# Patient Record
Sex: MALE | Race: WHITE | NOT HISPANIC OR LATINO | Employment: FULL TIME | URBAN - METROPOLITAN AREA
[De-identification: names, ages, dates, MRNs, and addresses within clinical notes are randomized per-mention and may not be internally consistent; named-entity substitution may affect disease eponyms.]

---

## 2018-07-10 DIAGNOSIS — I10 ESSENTIAL HYPERTENSION: Primary | ICD-10-CM

## 2018-07-10 RX ORDER — FUROSEMIDE 40 MG/1
TABLET ORAL
Qty: 30 TABLET | Refills: 5 | Status: SHIPPED | OUTPATIENT
Start: 2018-07-10 | End: 2018-07-24 | Stop reason: SDUPTHER

## 2018-07-18 DIAGNOSIS — B00.9 HERPES: Primary | ICD-10-CM

## 2018-07-18 RX ORDER — ACYCLOVIR 400 MG/1
TABLET ORAL
Qty: 20 TABLET | Refills: 1 | OUTPATIENT
Start: 2018-07-18

## 2018-07-18 NOTE — TELEPHONE ENCOUNTER
Please find out why he needs acyclovir that was requested by pharmacy  Is it for cold sore or herpes flare up?   thanks

## 2018-07-24 DIAGNOSIS — I10 ESSENTIAL HYPERTENSION: ICD-10-CM

## 2018-07-24 RX ORDER — FUROSEMIDE 40 MG/1
TABLET ORAL
Qty: 90 TABLET | Refills: 1 | Status: SHIPPED | OUTPATIENT
Start: 2018-07-24 | End: 2018-10-24 | Stop reason: SDUPTHER

## 2018-09-07 DIAGNOSIS — B00.9 HSV INFECTION: Primary | ICD-10-CM

## 2018-09-07 RX ORDER — WARFARIN SODIUM 2.5 MG/1
TABLET ORAL
COMMUNITY
Start: 2017-07-03

## 2018-09-07 RX ORDER — SILDENAFIL 100 MG/1
TABLET, FILM COATED ORAL
COMMUNITY
Start: 2017-10-17

## 2018-09-07 RX ORDER — ASPIRIN 81 MG/1
TABLET, CHEWABLE ORAL EVERY 24 HOURS
COMMUNITY
Start: 2017-07-03

## 2018-09-07 RX ORDER — ALPRAZOLAM 2 MG/1
TABLET ORAL
COMMUNITY
Start: 2013-12-02

## 2018-09-07 RX ORDER — MOMETASONE FUROATE 50 UG/1
SPRAY, METERED NASAL
COMMUNITY
Start: 2017-02-15

## 2018-09-07 RX ORDER — ACYCLOVIR 400 MG/1
400 TABLET ORAL 2 TIMES DAILY
Qty: 20 TABLET | Refills: 2 | Status: SHIPPED | OUTPATIENT
Start: 2018-09-07 | End: 2018-10-16 | Stop reason: SDUPTHER

## 2018-09-07 RX ORDER — ATORVASTATIN CALCIUM 20 MG/1
TABLET, FILM COATED ORAL
COMMUNITY
Start: 2018-06-05

## 2018-09-07 RX ORDER — ELECTROLYTES/DEXTROSE
SOLUTION, ORAL ORAL EVERY 24 HOURS
COMMUNITY
Start: 2015-11-04

## 2018-09-07 RX ORDER — METOPROLOL TARTRATE 50 MG/1
50 TABLET, FILM COATED ORAL 2 TIMES DAILY
Refills: 5 | COMMUNITY
Start: 2018-06-12

## 2018-09-07 RX ORDER — DOXYCYCLINE HYCLATE 100 MG/1
CAPSULE ORAL EVERY 24 HOURS
COMMUNITY
Start: 2017-10-06 | End: 2018-10-16 | Stop reason: SDUPTHER

## 2018-09-25 ENCOUNTER — OFFICE VISIT (OUTPATIENT)
Dept: FAMILY MEDICINE CLINIC | Facility: CLINIC | Age: 68
End: 2018-09-25
Payer: COMMERCIAL

## 2018-09-25 VITALS
WEIGHT: 168.2 LBS | SYSTOLIC BLOOD PRESSURE: 128 MMHG | OXYGEN SATURATION: 98 % | DIASTOLIC BLOOD PRESSURE: 72 MMHG | RESPIRATION RATE: 16 BRPM | TEMPERATURE: 98.2 F | HEART RATE: 84 BPM

## 2018-09-25 DIAGNOSIS — G47.33 OBSTRUCTIVE SLEEP APNEA ON CPAP: ICD-10-CM

## 2018-09-25 DIAGNOSIS — R21 RASH: ICD-10-CM

## 2018-09-25 DIAGNOSIS — I11.9 HYPERTENSIVE HEART DISEASE WITHOUT HEART FAILURE: ICD-10-CM

## 2018-09-25 DIAGNOSIS — Z95.0 CARDIAC PACEMAKER IN SITU: Chronic | ICD-10-CM

## 2018-09-25 DIAGNOSIS — B00.9 HERPES SIMPLEX: ICD-10-CM

## 2018-09-25 DIAGNOSIS — Z99.89 OBSTRUCTIVE SLEEP APNEA ON CPAP: ICD-10-CM

## 2018-09-25 DIAGNOSIS — I10 BENIGN HYPERTENSION: Primary | ICD-10-CM

## 2018-09-25 PROCEDURE — 99214 OFFICE O/P EST MOD 30 MIN: CPT | Performed by: FAMILY MEDICINE

## 2018-09-25 PROCEDURE — 3074F SYST BP LT 130 MM HG: CPT | Performed by: FAMILY MEDICINE

## 2018-09-25 PROCEDURE — 1160F RVW MEDS BY RX/DR IN RCRD: CPT | Performed by: FAMILY MEDICINE

## 2018-09-25 PROCEDURE — 3078F DIAST BP <80 MM HG: CPT | Performed by: FAMILY MEDICINE

## 2018-09-25 RX ORDER — CLOTRIMAZOLE AND BETAMETHASONE DIPROPIONATE 10; .64 MG/G; MG/G
CREAM TOPICAL 2 TIMES DAILY
Qty: 15 G | Refills: 3 | Status: SHIPPED | OUTPATIENT
Start: 2018-09-25

## 2018-09-25 NOTE — PATIENT INSTRUCTIONS
Dr Dwain Leigh, DO     Urologist in [place], [place]   Address: [address] [facility], [address] [place] [address]     Phone: [phone]    Or    Dr Maryjane Cheney 11, [address], [address] [facility], [address] [place] [address]  Kesha Kurtz 76, D O    Male   [phone]

## 2018-09-27 PROBLEM — I48.0 PAROXYSMAL ATRIAL FIBRILLATION (HCC): Chronic | Status: ACTIVE | Noted: 2017-10-17

## 2018-09-27 PROBLEM — Z95.2 S/P AVR (AORTIC VALVE REPLACEMENT): Status: ACTIVE | Noted: 2017-04-24

## 2018-09-27 PROBLEM — I71.20 THORACIC AORTIC ANEURYSM WITHOUT RUPTURE (HCC): Status: ACTIVE | Noted: 2017-07-03

## 2018-09-27 PROBLEM — Z95.2 S/P AVR (AORTIC VALVE REPLACEMENT): Chronic | Status: ACTIVE | Noted: 2017-04-24

## 2018-09-27 PROBLEM — I71.2 THORACIC AORTIC ANEURYSM WITHOUT RUPTURE (HCC): Status: ACTIVE | Noted: 2017-07-03

## 2018-09-27 PROBLEM — Z98.890 S/P TVR (TRICUSPID VALVE REPAIR): Status: ACTIVE | Noted: 2017-04-24

## 2018-09-27 PROBLEM — I48.0 PAROXYSMAL ATRIAL FIBRILLATION (HCC): Status: ACTIVE | Noted: 2017-10-17

## 2018-09-27 PROBLEM — Z95.0 CARDIAC PACEMAKER IN SITU: Status: ACTIVE | Noted: 2018-06-12

## 2018-09-27 PROBLEM — I44.1 AV BLOCK, 2ND DEGREE: Status: ACTIVE | Noted: 2018-06-12

## 2018-09-27 PROBLEM — I10 BENIGN HYPERTENSION: Chronic | Status: ACTIVE | Noted: 2017-02-15

## 2018-09-27 PROBLEM — Z98.890 S/P TVR (TRICUSPID VALVE REPAIR): Chronic | Status: ACTIVE | Noted: 2017-04-24

## 2018-09-27 PROBLEM — Z95.0 CARDIAC PACEMAKER IN SITU: Chronic | Status: ACTIVE | Noted: 2018-06-12

## 2018-09-27 PROBLEM — Z86.79 S/P ASCENDING AORTIC ANEURYSM REPAIR: Status: ACTIVE | Noted: 2017-04-24

## 2018-09-27 PROBLEM — Z98.890 S/P ASCENDING AORTIC ANEURYSM REPAIR: Chronic | Status: ACTIVE | Noted: 2017-04-24

## 2018-09-27 PROBLEM — Z98.890 S/P ASCENDING AORTIC ANEURYSM REPAIR: Status: ACTIVE | Noted: 2017-04-24

## 2018-09-27 PROBLEM — Z86.79 S/P ASCENDING AORTIC ANEURYSM REPAIR: Chronic | Status: ACTIVE | Noted: 2017-04-24

## 2018-09-27 PROBLEM — I10 BENIGN HYPERTENSION: Status: ACTIVE | Noted: 2017-02-15

## 2018-09-28 NOTE — PROGRESS NOTES
Assessment/Plan:           Problem List Items Addressed This Visit     Hypertensive heart disease (Chronic)     The current medical regimen is effective;  continue present plan and medications  F/u with cardiology         Rash    Relevant Medications    clotrimazole-betamethasone (LOTRISONE) 1-0 05 % cream    Benign hypertension - Primary (Chronic)     The current medical regimen is effective;  continue present plan and medications  Cardiac pacemaker in situ (Chronic)     Continue regular follow up with Dr Douglas Alvarez and pacemaker clinic         Herpes simplex (Chronic)    Obstructive sleep apnea on CPAP (Chronic)     The current medical regimen is effective;  continue present plan and medications  Subjective:      Patient ID: Sunita Haro is a 69 y o  male  75-year-old male with past medical history of hypertension, asthma, chronic HSV, rosacea, status post cardiac surgery and pacemaker placement for heart block paroxysmal atrial fibrillation, persistent insomnia presents today for chronic conditions follow-up  Overall he is feeling well and denies any specific concerns today  He is taking all of his medications and also using his CPAP  He does follow up with his cardiologist and cardiothoracic surgeon regularly  He denies any headaches, vision changes, dizziness, syncope, chest pain, shortness of breath, orthopnea, PND, lower extremity edema, nausea, abdominal pain, urinary symptoms  The following portions of the patient's history were reviewed and updated as appropriate: allergies, current medications, past family history, past medical history, past social history, past surgical history and problem list     Review of Systems   Constitutional: Negative for appetite change and unexpected weight change  Eyes: Negative for visual disturbance  Respiratory: Negative for chest tightness and shortness of breath      Cardiovascular: Negative for chest pain, palpitations and leg swelling  Genitourinary: Negative for frequency  Skin: Negative for color change  Neurological: Negative for dizziness  Objective:      /72 (BP Location: Left arm, Patient Position: Sitting, Cuff Size: Standard)   Pulse 84   Temp 98 2 °F (36 8 °C) (Tympanic)   Resp 16   Wt 76 3 kg (168 lb 3 2 oz)   SpO2 98%          Physical Exam   Constitutional: He appears well-developed and well-nourished  No distress  HENT:   Head: Normocephalic and atraumatic  Neck: Normal range of motion  Neck supple  Carotid bruit is not present  No edema present  Cardiovascular: Normal rate, regular rhythm and normal heart sounds  Pulmonary/Chest: Effort normal and breath sounds normal  He has no wheezes  He has no rales  Neurological: He is alert  Psychiatric: He has a normal mood and affect   His behavior is normal  Judgment and thought content normal

## 2018-10-11 DIAGNOSIS — B00.9 HSV INFECTION: ICD-10-CM

## 2018-10-11 RX ORDER — DOXYCYCLINE HYCLATE 100 MG/1
100 CAPSULE ORAL EVERY 12 HOURS SCHEDULED
Qty: 20 CAPSULE | Refills: 0 | Status: CANCELLED | OUTPATIENT
Start: 2018-10-11

## 2018-10-11 RX ORDER — ACYCLOVIR 400 MG/1
400 TABLET ORAL 2 TIMES DAILY
Qty: 20 TABLET | Refills: 0 | Status: CANCELLED | OUTPATIENT
Start: 2018-10-11 | End: 2018-10-21

## 2018-10-16 DIAGNOSIS — L71.9 ROSACEA: Primary | ICD-10-CM

## 2018-10-16 DIAGNOSIS — B00.9 HSV INFECTION: ICD-10-CM

## 2018-10-16 RX ORDER — DOXYCYCLINE HYCLATE 100 MG/1
100 CAPSULE ORAL EVERY 24 HOURS
Qty: 90 CAPSULE | Refills: 0 | Status: SHIPPED | OUTPATIENT
Start: 2018-10-16 | End: 2019-01-14

## 2018-10-16 RX ORDER — ACYCLOVIR 400 MG/1
400 TABLET ORAL 2 TIMES DAILY
Qty: 20 TABLET | Refills: 5 | Status: SHIPPED | OUTPATIENT
Start: 2018-10-16 | End: 2018-10-26

## 2018-10-24 DIAGNOSIS — I10 ESSENTIAL HYPERTENSION: ICD-10-CM

## 2018-10-24 RX ORDER — FUROSEMIDE 40 MG/1
40 TABLET ORAL DAILY
Qty: 90 TABLET | Refills: 1 | Status: SHIPPED | OUTPATIENT
Start: 2018-10-24

## 2018-12-19 ENCOUNTER — HOSPITAL ENCOUNTER (EMERGENCY)
Facility: HOSPITAL | Age: 68
Discharge: HOME | End: 2018-12-19
Attending: EMERGENCY MEDICINE | Admitting: EMERGENCY MEDICINE
Payer: COMMERCIAL

## 2018-12-19 ENCOUNTER — TELEPHONE (OUTPATIENT)
Dept: UROLOGY | Facility: CLINIC | Age: 68
End: 2018-12-19

## 2018-12-19 ENCOUNTER — APPOINTMENT (EMERGENCY)
Dept: CT IMAGING | Facility: HOSPITAL | Age: 68
End: 2018-12-19
Payer: COMMERCIAL

## 2018-12-19 VITALS
TEMPERATURE: 98.1 F | BODY MASS INDEX: 30.86 KG/M2 | WEIGHT: 174.16 LBS | HEART RATE: 62 BPM | OXYGEN SATURATION: 97 % | HEIGHT: 63 IN | SYSTOLIC BLOOD PRESSURE: 146 MMHG | DIASTOLIC BLOOD PRESSURE: 84 MMHG | RESPIRATION RATE: 18 BRPM

## 2018-12-19 DIAGNOSIS — N20.0 NEPHROLITHIASIS: Primary | ICD-10-CM

## 2018-12-19 DIAGNOSIS — R10.9 RIGHT FLANK PAIN: Primary | ICD-10-CM

## 2018-12-19 DIAGNOSIS — N13.2 HYDRONEPHROSIS CONCURRENT WITH AND DUE TO CALCULI OF KIDNEY AND URETER: ICD-10-CM

## 2018-12-19 DIAGNOSIS — R11.0 NAUSEA: ICD-10-CM

## 2018-12-19 DIAGNOSIS — N20.0 RENAL CALCULUS, RIGHT: ICD-10-CM

## 2018-12-19 LAB
ALBUMIN SERPL BCP-MCNC: 3.9 G/DL (ref 3.5–5)
ALP SERPL-CCNC: 93 U/L (ref 46–116)
ALT SERPL W P-5'-P-CCNC: 29 U/L (ref 12–78)
ANION GAP SERPL CALCULATED.3IONS-SCNC: 10 MMOL/L (ref 4–13)
APTT PPP: 36 SECONDS (ref 26–38)
AST SERPL W P-5'-P-CCNC: 26 U/L (ref 5–45)
BACTERIA UR QL AUTO: NORMAL /HPF
BASOPHILS # BLD AUTO: 0.03 THOUSANDS/ΜL (ref 0–0.1)
BASOPHILS NFR BLD AUTO: 0 % (ref 0–1)
BILIRUB DIRECT SERPL-MCNC: 0.17 MG/DL (ref 0–0.2)
BILIRUB SERPL-MCNC: 0.8 MG/DL (ref 0.2–1)
BILIRUB UR QL STRIP: NEGATIVE
BUN SERPL-MCNC: 25 MG/DL (ref 5–25)
CALCIUM SERPL-MCNC: 9.7 MG/DL (ref 8.3–10.1)
CHLORIDE SERPL-SCNC: 107 MMOL/L (ref 100–108)
CLARITY UR: CLEAR
CO2 SERPL-SCNC: 28 MMOL/L (ref 21–32)
COLOR UR: YELLOW
CREAT SERPL-MCNC: 1.53 MG/DL (ref 0.6–1.3)
EOSINOPHIL # BLD AUTO: 0 THOUSAND/ΜL (ref 0–0.61)
EOSINOPHIL NFR BLD AUTO: 0 % (ref 0–6)
ERYTHROCYTE [DISTWIDTH] IN BLOOD BY AUTOMATED COUNT: 13.3 % (ref 11.6–15.1)
GFR SERPL CREATININE-BSD FRML MDRD: 46 ML/MIN/1.73SQ M
GLUCOSE SERPL-MCNC: 145 MG/DL (ref 65–140)
GLUCOSE UR STRIP-MCNC: NEGATIVE MG/DL
HCT VFR BLD AUTO: 43.3 % (ref 36.5–49.3)
HGB BLD-MCNC: 14.2 G/DL (ref 12–17)
HGB UR QL STRIP.AUTO: ABNORMAL
IMM GRANULOCYTES # BLD AUTO: 0.04 THOUSAND/UL (ref 0–0.2)
IMM GRANULOCYTES NFR BLD AUTO: 0 % (ref 0–2)
INR PPP: 2.83 (ref 0.86–1.17)
KETONES UR STRIP-MCNC: NEGATIVE MG/DL
LEUKOCYTE ESTERASE UR QL STRIP: NEGATIVE
LYMPHOCYTES # BLD AUTO: 0.98 THOUSANDS/ΜL (ref 0.6–4.47)
LYMPHOCYTES NFR BLD AUTO: 9 % (ref 14–44)
MCH RBC QN AUTO: 31.7 PG (ref 26.8–34.3)
MCHC RBC AUTO-ENTMCNC: 32.8 G/DL (ref 31.4–37.4)
MCV RBC AUTO: 97 FL (ref 82–98)
MONOCYTES # BLD AUTO: 0.78 THOUSAND/ΜL (ref 0.17–1.22)
MONOCYTES NFR BLD AUTO: 7 % (ref 4–12)
NEUTROPHILS # BLD AUTO: 8.85 THOUSANDS/ΜL (ref 1.85–7.62)
NEUTS SEG NFR BLD AUTO: 84 % (ref 43–75)
NITRITE UR QL STRIP: NEGATIVE
NON-SQ EPI CELLS URNS QL MICRO: NORMAL /HPF
NRBC BLD AUTO-RTO: 0 /100 WBCS
PH UR STRIP.AUTO: 5.5 [PH] (ref 4.5–8)
PLATELET # BLD AUTO: 191 THOUSANDS/UL (ref 149–390)
PMV BLD AUTO: 10.7 FL (ref 8.9–12.7)
POTASSIUM SERPL-SCNC: 4.6 MMOL/L (ref 3.5–5.3)
PROT SERPL-MCNC: 7.7 G/DL (ref 6.4–8.2)
PROT UR STRIP-MCNC: NEGATIVE MG/DL
PROTHROMBIN TIME: 29.3 SECONDS (ref 11.8–14.2)
RBC # BLD AUTO: 4.48 MILLION/UL (ref 3.88–5.62)
RBC #/AREA URNS AUTO: NORMAL /HPF
SODIUM SERPL-SCNC: 145 MMOL/L (ref 136–145)
SP GR UR STRIP.AUTO: 1.01 (ref 1–1.03)
UROBILINOGEN UR QL STRIP.AUTO: 0.2 E.U./DL
WBC # BLD AUTO: 10.68 THOUSAND/UL (ref 4.31–10.16)
WBC #/AREA URNS AUTO: NORMAL /HPF

## 2018-12-19 PROCEDURE — 85025 COMPLETE CBC W/AUTO DIFF WBC: CPT | Performed by: EMERGENCY MEDICINE

## 2018-12-19 PROCEDURE — 85730 THROMBOPLASTIN TIME PARTIAL: CPT | Performed by: EMERGENCY MEDICINE

## 2018-12-19 PROCEDURE — 96360 HYDRATION IV INFUSION INIT: CPT

## 2018-12-19 PROCEDURE — 99284 EMERGENCY DEPT VISIT MOD MDM: CPT

## 2018-12-19 PROCEDURE — 36415 COLL VENOUS BLD VENIPUNCTURE: CPT | Performed by: EMERGENCY MEDICINE

## 2018-12-19 PROCEDURE — 85610 PROTHROMBIN TIME: CPT | Performed by: EMERGENCY MEDICINE

## 2018-12-19 PROCEDURE — 81001 URINALYSIS AUTO W/SCOPE: CPT | Performed by: EMERGENCY MEDICINE

## 2018-12-19 PROCEDURE — 80048 BASIC METABOLIC PNL TOTAL CA: CPT | Performed by: EMERGENCY MEDICINE

## 2018-12-19 PROCEDURE — 74176 CT ABD & PELVIS W/O CONTRAST: CPT

## 2018-12-19 PROCEDURE — 80076 HEPATIC FUNCTION PANEL: CPT | Performed by: EMERGENCY MEDICINE

## 2018-12-19 RX ORDER — ONDANSETRON 4 MG/1
TABLET, ORALLY DISINTEGRATING ORAL
Qty: 12 TABLET | Refills: 0 | Status: SHIPPED | OUTPATIENT
Start: 2018-12-19

## 2018-12-19 RX ORDER — OXYCODONE HYDROCHLORIDE AND ACETAMINOPHEN 5; 325 MG/1; MG/1
1 TABLET ORAL EVERY 6 HOURS PRN
Qty: 20 TABLET | Refills: 0 | Status: SHIPPED | OUTPATIENT
Start: 2018-12-19 | End: 2018-12-29

## 2018-12-19 RX ORDER — ACETAMINOPHEN 325 MG/1
650 TABLET ORAL ONCE
Status: COMPLETED | OUTPATIENT
Start: 2018-12-19 | End: 2018-12-19

## 2018-12-19 RX ADMIN — SODIUM CHLORIDE 1000 ML: 0.9 INJECTION, SOLUTION INTRAVENOUS at 09:47

## 2018-12-19 RX ADMIN — ACETAMINOPHEN 650 MG: 325 TABLET, FILM COATED ORAL at 09:54

## 2018-12-19 NOTE — TELEPHONE ENCOUNTER
Can we please arrange an ASAP appointment in the [place] office for this gentleman? He refused admission and surgery from the ER 12/19 but has a large stone that will require surgery  AP visit ok if no MD available   Thank you

## 2018-12-19 NOTE — ED PROVIDER NOTES
History  Chief Complaint   Patient presents with    Flank Pain     Pt c/o R sided flank pain that radiates to RLQ of abdomen  Pt c/o slight nausea  Pt states he has been having slight blood in urine for about 1 month     HPI   24-year-old white male with a chief complaint of some blood in his urine  Patient states that he started with some right sided flank pain that radiated to his right lower quadrant last evening that was accompanied by some nausea  Patient denies any vomiting or any fever  Patient states he did have some chills that he always has chills because he is on a blood thinner  Patient has a history of a kidney stone the past x3  Patient has passed all his kidney stones and never needed any surgery or stents in the past   Patient states the pain is similar to his prior kidney stones  Patient has a history of a pacemaker as well as the aortic valve and he is on a blood thinner at the present time  Prior to Admission Medications   Prescriptions Last Dose Informant Patient Reported? Taking?    ALPRAZolam (XANAX) 2 MG tablet   Yes No   Sig: Take by mouth   Multiple Vitamins-Minerals (MULTIVITAMIN ADULT) TABS   Yes No   Sig: every 24 hours   acyclovir (ZOVIRAX) 400 MG tablet   No No   Sig: Take 1 tablet (400 mg total) by mouth 2 (two) times a day for 10 days   aspirin 81 mg chewable tablet   Yes No   Sig: every 24 hours   atorvastatin (LIPITOR) 20 mg tablet   Yes No   Sig: TAKE 1 TABLET EVERY DAY   clotrimazole-betamethasone (LOTRISONE) 1-0 05 % cream   No No   Sig: Apply topically 2 (two) times a day   doxycycline hyclate (VIBRAMYCIN) 100 mg capsule   No No   Sig: Take 1 capsule (100 mg total) by mouth every 24 hours for 90 days   furosemide (LASIX) 40 mg tablet   No No   Sig: Take 1 tablet (40 mg total) by mouth daily   metoprolol tartrate (LOPRESSOR) 50 mg tablet   Yes No   Sig: Take 50 mg by mouth 2 (two) times a day   mometasone (NASONEX) 50 mcg/act nasal spray   Yes No   Sig: spray 2 spray by intranasal route  every day in each nostril   sildenafil (VIAGRA) 100 mg tablet   Yes No   Sig: take 1 tablet (100MG)  by oral route  every day as needed approximately 1 hour before sexual activity   warfarin (COUMADIN) 2 5 mg tablet   Yes No   Sig: take 1-2 tabs hs      Facility-Administered Medications: None       Past Medical History:   Diagnosis Date    Anxiety     Hyperlipidemia     Hypertension        Past Surgical History:   Procedure Laterality Date    COLONOSCOPY  2012    OTHER SURGICAL HISTORY  2017    Tricupsid valve repair    OTHER SURGICAL HISTORY  2017    Aortic aneurysm repair        Family History   Problem Relation Age of Onset    Hypertension Mother     Cancer Mother     Cancer Father      I have reviewed and agree with the history as documented  Social History   Substance Use Topics    Smoking status: Never Smoker    Smokeless tobacco: Never Used      Comment: No passive smoke exposure    Alcohol use Yes      Comment: social        Review of Systems   Constitutional: Negative for diaphoresis, fatigue and fever  HENT: Negative for congestion, ear pain, nosebleeds and sore throat  Eyes: Negative for photophobia, pain, discharge and visual disturbance  Respiratory: Negative for cough, choking, chest tightness, shortness of breath and wheezing  Cardiovascular: Negative for chest pain and palpitations  Gastrointestinal: Positive for abdominal pain and nausea  Negative for abdominal distention, diarrhea and vomiting  Genitourinary: Positive for flank pain, hematuria and testicular pain  Negative for difficulty urinating, dysuria and frequency  Musculoskeletal: Positive for back pain  Negative for gait problem and joint swelling  Skin: Negative for color change and rash  Neurological: Negative for dizziness, syncope and headaches  Psychiatric/Behavioral: Negative for behavioral problems and confusion  The patient is not nervous/anxious      All other systems reviewed and are negative  Physical Exam  Physical Exam   Constitutional: He is oriented to person, place, and time  He appears well-developed and well-nourished  75-year-old white male sitting on the stretcher in no acute distress  HENT:   Head: Normocephalic and atraumatic  Mouth/Throat: Oropharynx is clear and moist    Eyes: Pupils are equal, round, and reactive to light  EOM are normal    Neck: Normal range of motion  Neck supple  Cardiovascular: Normal rate and regular rhythm     + artificial heart valve click     Pulmonary/Chest: Effort normal and breath sounds normal  No respiratory distress  He has no wheezes  Abdominal: Soft  Bowel sounds are normal  He exhibits no mass  There is no tenderness  There is no rebound and no guarding  Musculoskeletal: Normal range of motion  Patient has some mild right flank pain   Neurological: He is alert and oriented to person, place, and time  No cranial nerve deficit  He exhibits normal muscle tone  Coordination normal    Skin: Skin is warm and dry  Psychiatric: He has a normal mood and affect  Nursing note and vitals reviewed        Vital Signs  ED Triage Vitals [12/19/18 0908]   Temperature Pulse Respirations Blood Pressure SpO2   98 1 °F (36 7 °C) 69 18 (!) 171/81 97 %      Temp Source Heart Rate Source Patient Position - Orthostatic VS BP Location FiO2 (%)   Oral Monitor Sitting Right arm --      Pain Score       6           Vitals:    12/19/18 0908 12/19/18 1233   BP: (!) 171/81 146/84   Pulse: 69 62   Patient Position - Orthostatic VS: Sitting Sitting       Visual Acuity      ED Medications  Medications   sodium chloride 0 9 % bolus 1,000 mL (0 mL Intravenous Stopped 12/19/18 1047)   acetaminophen (TYLENOL) tablet 650 mg (650 mg Oral Given 12/19/18 0954)       Diagnostic Studies  Results Reviewed     Procedure Component Value Units Date/Time    Protime-INR [[id number]]  (Abnormal) Collected:  12/19/18 1152    Lab Status:  Final result Specimen:  Blood from Arm, Right Updated:  12/19/18 1215     Protime 29 3 (H) seconds      INR 2 83 (H)    APTT [[id number]]  (Normal) Collected:  12/19/18 1152    Lab Status:  Final result Specimen:  Blood from Arm, Right Updated:  12/19/18 1215     PTT 36 seconds     Basic metabolic panel [[id number]]  (Abnormal) Collected:  12/19/18 0947    Lab Status:  Final result Specimen:  Blood from Arm, Right Updated:  12/19/18 1009     Sodium 145 mmol/L      Potassium 4 6 mmol/L      Chloride 107 mmol/L      CO2 28 mmol/L      ANION GAP 10 mmol/L      BUN 25 mg/dL      Creatinine 1 53 (H) mg/dL      Glucose 145 (H) mg/dL      Calcium 9 7 mg/dL      eGFR 46 ml/min/1 73sq m     Narrative:         National Kidney Disease Education Program recommendations are as follows:  GFR calculation is accurate only with a steady state creatinine  Chronic Kidney disease less than 60 ml/min/1 73 sq  meters  Kidney failure less than 15 ml/min/1 73 sq  meters      Hepatic function panel [[id number]]  (Normal) Collected:  12/19/18 0947    Lab Status:  Final result Specimen:  Blood from Arm, Right Updated:  12/19/18 1009     Total Bilirubin 0 80 mg/dL      Bilirubin, Direct 0 17 mg/dL      Alkaline Phosphatase 93 U/L      AST 26 U/L      ALT 29 U/L      Total Protein 7 7 g/dL      Albumin 3 9 g/dL     CBC and differential [[id number]]  (Abnormal) Collected:  12/19/18 0947    Lab Status:  Final result Specimen:  Blood from Arm, Right Updated:  12/19/18 0954     WBC 10 68 (H) Thousand/uL      RBC 4 48 Million/uL      Hemoglobin 14 2 g/dL      Hematocrit 43 3 %      MCV 97 fL      MCH 31 7 pg      MCHC 32 8 g/dL      RDW 13 3 %      MPV 10 7 fL      Platelets 873 Thousands/uL      nRBC 0 /100 WBCs      Neutrophils Relative 84 (H) %      Immat GRANS % 0 %      Lymphocytes Relative 9 (L) %      Monocytes Relative 7 %      Eosinophils Relative 0 %      Basophils Relative 0 %      Neutrophils Absolute 8 85 (H) Thousands/µL      Immature Grans Absolute 0 04 Thousand/uL Lymphocytes Absolute 0 98 Thousands/µL      Monocytes Absolute 0 78 Thousand/µL      Eosinophils Absolute 0 00 Thousand/µL      Basophils Absolute 0 03 Thousands/µL     Urine Microscopic [[id number]]  (Normal) Collected:  12/19/18 2714    Lab Status:  Final result Specimen:  Urine from Urine, Clean Catch Updated:  12/19/18 0940     RBC, UA None Seen /hpf      WBC, UA None Seen /hpf      Epithelial Cells None Seen /hpf      Bacteria, UA None Seen /hpf     UA w Reflex to Microscopic w Reflex to Culture [[id number]]  (Abnormal) Collected:  12/19/18 4097    Lab Status:  Final result Specimen:  Urine from Urine, Clean Catch Updated:  12/19/18 0931     Color, UA Yellow     Clarity, UA Clear     Specific Gravity, UA 1 010     pH, UA 5 5     Leukocytes, UA Negative     Nitrite, UA Negative     Protein, UA Negative mg/dl      Glucose, UA Negative mg/dl      Ketones, UA Negative mg/dl      Urobilinogen, UA 0 2 E U /dl      Bilirubin, UA Negative     Blood, UA Small (A)                 CT renal stone study abdomen pelvis without contrast   Final Result by Sondra Reynolds MD (12/19 1022)      There is a 14 x 8 x 12 mm stone at the right ureteropelvic junction with associated mild hydronephrosis (series 2 image 56 )             Workstation performed: [id number]                    Procedures  Procedures       Phone Contacts  ED Phone Contact    ED Course     11:32 AM:  Call to Urology - spoke from to [facility] room Urology  She will speak with Dr Christy Jeong about possible stent procedure, versus admission, versus discharge  She would like a PT INR done at this time as well as keep the patient NPO     12:07 PM:  Spoke with Dr Hector Abrams - Dr Hector Abrams recommended that patient be admitted and have a stent placed  I discussed this with patient    Patient states that he needs to go home and take care of some things including the heat and that he also needs to work because they are short and he is scheduled for a double shift on Saturday  Patient works in [employer] [place] [employer] [place]  I explained to patient the risks of continued worsening of hydronephrosis or increased pain or worsening kidney function  I also explained to patient that he develops any fever, shaking chills, vomiting, that he should return to the emergency department immediately  I will place patient on some Percocet and some Zofran to go if needed  Patient did not require any narcotics in the emergency department and was relieved of pain with Tylenol  Patient also stated that his nausea resolved and did not require any IV Zofran  12:20 p m :  I spoke with Dr Mell Slaughtre, and explained patient's wishes  He states that his office will contact him to set up close follow-up as soon as possible  MDM  CritCare Time     Differential diagnosis includes:  1  Hematuria  2  Flank pain  3  Right lower quadrant pain  4  Rule out renal calculus  5  Nausea  6  History of heart valve replacement  7  History of a pacemaker     Disposition  Final diagnoses:   Right flank pain   Renal calculus, right   Hydronephrosis concurrent with and due to calculi of kidney and ureter   Nausea     Time reflects when diagnosis was documented in both MDM as applicable and the Disposition within this note     Time User Action Codes Description Comment    12/19/2018 12:25 PM Nora Loach Add [R10 9] Right flank pain     12/19/2018 12:25 PM Paguate Katherine L Add [N20 0] Renal calculus, right     12/19/2018 12:36 PM Nora Loach Add [N13 2] Hydronephrosis concurrent with and due to calculi of kidney and ureter     12/19/2018 12:36 PM Nora Loach Add [R11 0] Nausea       ED Disposition     ED Disposition Condition Comment    Discharge  Yani Espitia discharge to home/self care      Condition at discharge: Good        Follow-up Information     Follow up With Specialties Details Why Jade Logan MD Urology In 2 days  [address] [facility] [address]      Jeovanny Boyle MD Urology In 2 days  [place]  [phone]            Discharge Medication List as of 12/19/2018 12:42 PM      START taking these medications    Details   ondansetron (ZOFRAN-ODT) 4 mg disintegrating tablet Place 1 tab (4 mg) under your tongue every 6 hours for nausea or vomiting , Print      oxyCODONE-acetaminophen (PERCOCET) 5-325 mg per tablet Take 1 tablet by mouth every 6 (six) hours as needed for severe pain for up to 10 days Max Daily Amount: 4 tablets, Starting Wed 12/19/2018, Until Sat 12/29/2018, Print         CONTINUE these medications which have NOT CHANGED    Details   acyclovir (ZOVIRAX) 400 MG tablet Take 1 tablet (400 mg total) by mouth 2 (two) times a day for 10 days, Starting Tue 10/16/2018, Until Fri 10/26/2018, Normal      ALPRAZolam (XANAX) 2 MG tablet Take by mouth, Starting Mon 12/2/2013, Historical Med      aspirin 81 mg chewable tablet every 24 hours, Starting Mon 7/3/2017, Historical Med      atorvastatin (LIPITOR) 20 mg tablet TAKE 1 TABLET EVERY DAY, Historical Med      clotrimazole-betamethasone (LOTRISONE) 1-0 05 % cream Apply topically 2 (two) times a day, Starting Tue 9/25/2018, Normal      doxycycline hyclate (VIBRAMYCIN) 100 mg capsule Take 1 capsule (100 mg total) by mouth every 24 hours for 90 days, Starting Tue 10/16/2018, Until Mon 1/14/2019, Normal      furosemide (LASIX) 40 mg tablet Take 1 tablet (40 mg total) by mouth daily, Starting Wed 10/24/2018, Normal      metoprolol tartrate (LOPRESSOR) 50 mg tablet Take 50 mg by mouth 2 (two) times a day, Starting Tue 6/12/2018, Historical Med      mometasone (NASONEX) 50 mcg/act nasal spray spray 2 spray by intranasal route  every day in each nostril, Historical Med      Multiple Vitamins-Minerals (MULTIVITAMIN ADULT) TABS every 24 hours, Starting Wed 11/4/2015, Historical Med      sildenafil (VIAGRA) 100 mg tablet take 1 tablet (100MG)  by oral route  every day as needed approximately 1 hour before sexual activity, Historical Med      warfarin (COUMADIN) 2 5 mg tablet take 1-2 tabs hs, Historical Med           No discharge procedures on file      ED Provider  Electronically Signed by           Dann Narayanan DO  12/19/18 1109

## 2018-12-19 NOTE — TELEPHONE ENCOUNTER
Left message on home & cell for PT to call to arrange asap appointment  There are appointment available in [facility] this week

## 2018-12-19 NOTE — ED NOTES
Pt given urinal and several strainers for home   Pt instructed on how to strain his urine for the next several days     Vielka Edmonds RN  12/19/18 3125

## 2018-12-19 NOTE — PROGRESS NOTES
Was called by the [facility] emergency room physician  Patient presented with some mild to moderate flank pain on the right side  He has a history of prior stone disease but has been able to pass them without intervention  He has not seen a prior urologist     Patient does not have any infectious signs or symptoms with no fevers or chills and normal vital signs  He does not have an markedly elevated white count or creatinine, (currently creat 1 58 with no prior baseline)  I have reviewed the patient's images which show a rather large stone in the renal pelvis and proximal ureteropelvic junction  Measures approximately 1 4 cm  This stone is not likely to pass spontaneously given its size  Unfortunately, the patient did eat breakfast this morning and would not be a candidate for elective surgery today  The patient could certainly be admitted, medically optimized and placed on the schedule for an elective right-sided stent on the 20th of December, tomorrow  In discussion with the emergency room physician, the patient is very motivated for discharge and has refused admission  His pain is extremely well controlled  He has inquired about elective outpatient shockwave therapy for the stone but given his anticoagulation use, he would not be a candidate for this procedure  After discussion with his emergency room doctor, Dr Sal Nissen, we have agreed that the patient should be given short interval follow-up in our office to potentially arrange stenting and/or ureteroscopy  Should he change his mind, we would be happy to arrange inpatient urologic consultation and add-on surgery for 12/20  Otherwise, outpatient followup to be arranged

## 2018-12-19 NOTE — DISCHARGE INSTRUCTIONS
1  The Urology office will call you to set up a follow-up appointment as soon as possible  If you do not hear from them in a day or two,  please call their office and tell them you were seen in the emergency room and that I spoke with Dr Roxy Jeronimo  2  Return if any problems, especially fever, shaking chills, increased pain, nausea or vomiting  3   Strain all urine, and look for a kidney stone and save the stone to show the Urologist        Kidney Stones   WHAT YOU NEED TO KNOW:   Kidney stones form in the urinary system when the water and waste in your urine are out of balance  When this happens, certain types of waste crystals separate from the urine  The crystals build up and form kidney stones  You may have 1 or more kidney stones  DISCHARGE INSTRUCTIONS:   Return to the emergency department if:   · You have vomiting that is not relieved by medicine  Contact your healthcare provider if:   · You have a fever  · You have trouble passing urine  · You see blood in your urine  · You have severe pain  · You have any questions or concerns about your condition or care  Medicines:   · NSAIDs , such as ibuprofen, help decrease swelling, pain, and fever  This medicine is available with or without a doctor's order  NSAIDs can cause stomach bleeding or kidney problems in certain people  If you take blood thinner medicine, always ask your healthcare provider if NSAIDs are safe for you  Always read the medicine label and follow directions  · Prescription medicine  may be given  Ask how to take this medicine safely  · Medicines  to balance your electrolytes may be needed  · Take your medicine as directed  Contact your healthcare provider if you think your medicine is not helping or if you have side effects  Tell him or her if you are allergic to any medicine  Keep a list of the medicines, vitamins, and herbs you take  Include the amounts, and when and why you take them   Bring the list or the pill bottles to follow-up visits  Carry your medicine list with you in case of an emergency  Follow up with your healthcare provider as directed: You may need to return for more tests  Write down your questions so you remember to ask them during your visits  Self-care:   · Drink plenty of liquids  Your healthcare provider may tell you to drink at least 8 to 12 (eight-ounce) cups of liquids each day  This helps flush out the kidney stones when you urinate  Water is the best liquid to drink  · Strain your urine every time you go to the bathroom  Urinate through a strainer or a piece of thin cloth to catch the stones  Take the stones to your healthcare provider so they can be sent to the lab for tests  This will help your healthcare providers plan the best treatment for you  · Eat a variety of healthy foods  Healthy foods include fruits, vegetables, whole-grain breads, low-fat dairy products, beans, and fish  You may need to limit how much sodium (salt) or protein you eat  Ask for information about the best foods for you  · Stay active  Your stones may pass more easily by if you stay active  Ask about the best activities for you  After you pass your kidney stones:  Once you have passed your kidney stones, your healthcare provider may  order a 24-hour urine test  Results from a 24-hour urine test will help your healthcare provider plan ways to prevent more stones from forming  If you are told to do a 24-hour test, your healthcare provider will give you more instructions  © 2017 [employer] Information is for End User's use only and may not be sold, redistributed or otherwise used for commercial purposes  All illustrations and images included in CareNotes® are the copyrighted property of [employer] , "[employer]  or Lamont Reyna  The above information is an  only  It is not intended as medical advice for individual conditions or treatments   Talk to your doctor, nurse or pharmacist before following any medical regimen to see if it is safe and effective for you

## 2018-12-20 ENCOUNTER — TELEPHONE (OUTPATIENT)
Dept: UROLOGY | Facility: AMBULATORY SURGERY CENTER | Age: 68
End: 2018-12-20

## 2018-12-20 ENCOUNTER — OFFICE VISIT (OUTPATIENT)
Dept: UROLOGY | Facility: CLINIC | Age: 68
End: 2018-12-20
Payer: COMMERCIAL

## 2018-12-20 VITALS
SYSTOLIC BLOOD PRESSURE: 130 MMHG | HEART RATE: 71 BPM | HEIGHT: 63 IN | WEIGHT: 173.4 LBS | BODY MASS INDEX: 30.72 KG/M2 | DIASTOLIC BLOOD PRESSURE: 74 MMHG

## 2018-12-20 DIAGNOSIS — N20.1 RIGHT URETERAL STONE: ICD-10-CM

## 2018-12-20 DIAGNOSIS — R31.0 GROSS HEMATURIA: Primary | ICD-10-CM

## 2018-12-20 LAB
SL AMB  POCT GLUCOSE, UA: ABNORMAL
SL AMB LEUKOCYTE ESTERASE,UA: ABNORMAL
SL AMB POCT BILIRUBIN,UA: ABNORMAL
SL AMB POCT BLOOD,UA: ABNORMAL
SL AMB POCT CLARITY,UA: CLEAR
SL AMB POCT COLOR,UA: YELLOW
SL AMB POCT KETONES,UA: ABNORMAL
SL AMB POCT NITRITE,UA: ABNORMAL
SL AMB POCT PH,UA: 5
SL AMB POCT SPECIFIC GRAVITY,UA: 1.03
SL AMB POCT URINE PROTEIN: ABNORMAL
SL AMB POCT UROBILINOGEN: ABNORMAL

## 2018-12-20 PROCEDURE — 81002 URINALYSIS NONAUTO W/O SCOPE: CPT | Performed by: PHYSICIAN ASSISTANT

## 2018-12-20 PROCEDURE — 99244 OFF/OP CNSLTJ NEW/EST MOD 40: CPT | Performed by: PHYSICIAN ASSISTANT

## 2018-12-20 NOTE — TELEPHONE ENCOUNTER
Patient would like a return to work note  Please email to him if possible  Email is [email or web address]  NET

## 2018-12-20 NOTE — TELEPHONE ENCOUNTER
Patient was seen in the office today by Renny Vicente PA-C  Sent note to email for patient to return to work tomorrow

## 2018-12-20 NOTE — PROGRESS NOTES
1  Gross hematuria  POCT urine dip   2  Right ureteral stone  Case request operating room: CYSTOSCOPY URETEROSCOPY WITH LITHOTRIPSY HOLMIUM LASER, RETROGRADE PYELOGRAM AND INSERTION STENT URETERAL    Case request operating room: CYSTOSCOPY URETEROSCOPY WITH LITHOTRIPSY HOLMIUM LASER, RETROGRADE PYELOGRAM AND INSERTION STENT URETERAL         Assessment and plan:       1  14mm right UPJ calculus with mild right hydronephrosis  - I personally reviewed the patient's CT with him in the office today  We discussed that given the size of the stone, there is a low likelihood of him being able to pass it on his own  Reviewed the risks of her retained ureteral calculus including pain, hydronephrosis, pyelonephritis, sepsis, and renal failure  Patient verbalized understanding   - recommendations are to proceed with cystoscopy, right ureteroscopy, holmium laser, basket extraction, retrograde pyelogram, and right ureteral stent insertion  Patient was counseled that given the large size of his stone, this may need to be performed in a staged approach  - we reviewed the risks of the procedure including cardiovascular complications, VTE, bleeding, infection, damage to nearby structures, a need for additional procedures  Patient verbalized understanding  This will be reviewed preoperatively by the surgeon  - patient is on anticoagulation given history of aortic valve replacement and pacemaker  He will need cardiac clearance prior to surgical intervention  Patient has a [facility]ologist with [facility]   - patient has a prescription for Percocet Zofran is utilizing that time for any discomfort he  He is overall very comfortable nontoxic at this time  ER precautions provided of uncontrolled pain, fevers, chills  He is aware to contact us with any concerns  He was instructed to continue with proper hydration  - patient's procedure will be scheduled in the near future  - patient is amenable to this plan    All questions answered  Elver Mcmanus PA-C      Chief Complaint     New patient ER follow-up    History of Present Illness     Douglas Barreto is a 69 y o  male presenting as a new patient for emergency department follow-up  Patient was in the emergency department yesterday, 12/19/2018 in regards to flank pain  Patient had a history of nephrolithiasis, however has never required surgical intervention in the past   A CT had revealed a large 1 4 cm right UPJ calculus  Patient had pain control and was not interested admission at that time  Patient wished for discharge with outpatient follow-up  He had inquired about elective outpatient shockwave therapy for the stone but given his anticoagulation use, he would not be a candidate for this procedure  Patient has a history of pacemaker and aortic valve which requires his anticoagulation  Patient follows with a cardiologist Dr Dayne Mulligan at [facility]  Patient was discharged home with Percocet and Zofran  Yesterday, patient's urine was leukocyte and nitrite negative  WBC of 10 68, creatinine of 1 53, previous baselines have been between 1 1-1 3 as per Care everywhere documentation  Patient states that over the past 24 hours, his symptoms have been well controlled  He denies any flank pain, nausea, vomiting, fevers  He states he has had chills ever since the initiation of Coumadin in April 2017  He denies any new onset chills or worsening chills  Urine dip in the office today is leukocyte and nitrite negative with trace blood  Laboratory     Lab Results   Component Value Date    CREATININE 1 53 (H) 12/19/2018       Review of Systems     Review of Systems   Constitutional: Negative for activity change, appetite change, chills, diaphoresis, fatigue, fever and unexpected weight change  Respiratory: Negative for chest tightness and shortness of breath  Cardiovascular: Negative for chest pain, palpitations and leg swelling     Gastrointestinal: Negative for abdominal distention, abdominal pain, constipation, diarrhea, nausea and vomiting  Genitourinary: Negative for decreased urine volume, difficulty urinating, dysuria, enuresis, flank pain, frequency, genital sores, hematuria and urgency  Musculoskeletal: Negative for back pain, gait problem and myalgias  Skin: Negative for color change, pallor, rash and wound  Psychiatric/Behavioral: Negative for behavioral problems  The patient is not nervous/anxious  Allergies     Allergies   Allergen Reactions    Azithromycin     Erythromycin      GI upset       Physical Exam     Physical Exam   Constitutional: He is oriented to person, place, and time  He appears well-developed and well-nourished  No distress  HENT:   Head: Normocephalic and atraumatic  Eyes: Conjunctivae are normal    Neck: Normal range of motion  No tracheal deviation present  Cardiovascular: Normal rate and regular rhythm  Exam reveals no gallop and no friction rub  No murmur heard  Systolic click from aortic valve   Pulmonary/Chest: Effort normal and breath sounds normal  No respiratory distress  He has no wheezes  He has no rales  Abdominal:   No CVA tenderness   Musculoskeletal: Normal range of motion  He exhibits no edema or deformity  Neurological: He is alert and oriented to person, place, and time  Skin: Skin is warm and dry  No rash noted  He is not diaphoretic  No erythema  Psychiatric: He has a normal mood and affect   His behavior is normal          Vital Signs     Vitals:    12/20/18 1000   BP: 130/74   BP Location: Left arm   Patient Position: Sitting   Cuff Size: Standard   Pulse: 71   Weight: 78 7 kg (173 lb 6 4 oz)   Height: 5' 2 5" (1 588 m)         Current Medications       Current Outpatient Prescriptions:     ALPRAZolam (XANAX) 2 MG tablet, Take by mouth daily at bedtime as needed  , Disp: , Rfl:     aspirin 81 mg chewable tablet, every 24 hours, Disp: , Rfl:     atorvastatin (LIPITOR) 20 mg tablet, TAKE 1 TABLET EVERY DAY, Disp: , Rfl:     doxycycline hyclate (VIBRAMYCIN) 100 mg capsule, Take 1 capsule (100 mg total) by mouth every 24 hours for 90 days (Patient taking differently: Take 100 mg by mouth continuous as needed  ), Disp: 90 capsule, Rfl: 0    furosemide (LASIX) 40 mg tablet, Take 1 tablet (40 mg total) by mouth daily, Disp: 90 tablet, Rfl: 1    metoprolol tartrate (LOPRESSOR) 50 mg tablet, Take 50 mg by mouth 2 (two) times a day, Disp: , Rfl: 5    Multiple Vitamins-Minerals (MULTIVITAMIN ADULT) TABS, every 24 hours, Disp: , Rfl:     oxyCODONE-acetaminophen (PERCOCET) 5-325 mg per tablet, Take 1 tablet by mouth every 6 (six) hours as needed for severe pain for up to 10 days Max Daily Amount: 4 tablets, Disp: 20 tablet, Rfl: 0    sildenafil (VIAGRA) 100 mg tablet, take 1 tablet (100MG)  by oral route  every day as needed approximately 1 hour before sexual activity, Disp: , Rfl:     warfarin (COUMADIN) 2 5 mg tablet, take 1-2 tabs hs, Disp: , Rfl:     acyclovir (ZOVIRAX) 400 MG tablet, Take 1 tablet (400 mg total) by mouth 2 (two) times a day for 10 days, Disp: 20 tablet, Rfl: 5    clotrimazole-betamethasone (LOTRISONE) 1-0 05 % cream, Apply topically 2 (two) times a day (Patient not taking: Reported on 12/20/2018 ), Disp: 15 g, Rfl: 3    mometasone (NASONEX) 50 mcg/act nasal spray, spray 2 spray by intranasal route  every day in each nostril, Disp: , Rfl:     ondansetron (ZOFRAN-ODT) 4 mg disintegrating tablet, Place 1 tab (4 mg) under your tongue every 6 hours for nausea or vomiting   (Patient not taking: Reported on 12/20/2018 ), Disp: 12 tablet, Rfl: 0      Active Problems     Patient Active Problem List   Diagnosis    Hypertensive heart disease    Rash    AV block, 2nd degree    Benign hypertension    Cardiac pacemaker in situ    Diverticular disease of colon    Male erectile disorder    Herpes simplex    Asthma    Insomnia    Obstructive sleep apnea on CPAP    Paroxysmal atrial fibrillation (HCC)    Rosacea    S/P ascending aortic aneurysm repair    S/P AVR (aortic valve replacement)    S/P TVR (tricuspid valve repair)    Thoracic aortic aneurysm without rupture (HCC)    Nephrolithiasis         Past Medical History     Past Medical History:   Diagnosis Date    Anxiety     Hyperlipidemia     Hypertension        Surgical History     Past Surgical History:   Procedure Laterality Date    COLONOSCOPY  2012    OTHER SURGICAL HISTORY  2017    Tricupsid valve repair    OTHER SURGICAL HISTORY  2017    Aortic aneurysm repair        Family History     Family History   Problem Relation Age of Onset    Hypertension Mother     Cancer Mother     Cancer Father        Social History     Social History     Radiology   CT ABDOMEN AND PELVIS WITHOUT IV CONTRAST - LOW DOSE RENAL STONE      INDICATION:   Hematuria, right flank pain, right groin pain      COMPARISON:  None      TECHNIQUE:  Low dose thin section CT examination of the abdomen and pelvis was performed without intravenous or oral contrast according to a protocol specifically designed to evaluate for urinary tract calculus  Axial, sagittal, and coronal 2D   reformatted images were created from the source data and submitted for interpretation  Evaluation for pathology in the abdomen and pelvis that is unrelated to urinary tract calculi is limited       Radiation dose length product (DLP) for this visit:  362 mGy-cm     This examination, like all CT scans performed in the [facility], was performed utilizing techniques to minimize radiation dose exposure, including the use of iterative   reconstruction and automated exposure control       FINDINGS:     RIGHT KIDNEY AND URETER:  There is a 14 x 8 x 12 mm stone at the right ureteropelvic junction with associated mild hydronephrosis (series 2 image 56 )     There are multiple small simple cysts in the right kidney      LEFT KIDNEY AND URETER:  No urinary tract calculi  No hydronephrosis or hydroureter  There are multiple small simple cysts in the left kidney      URINARY BLADDER:   Unremarkable       No significant abnormality in the visualized lung bases      Limited low radiation dose noncontrast CT evaluation demonstrates no clinically significant abnormality of liver, spleen, pancreas, or adrenal glands  No calcified gallstones or gallbladder wall thickening noted  No ascites or bulky lymphadenopathy on this limited noncontrast study  Bowel loops appear unremarkable  Small bilateral fat-containing inguinal hernias  Limited evaluation demonstrates no evidence to suggest acute appendicitis    No acute fracture or destructive osseous lesion is identified         IMPRESSION:  There is a 14 x 8 x 12 mm stone at the right ureteropelvic junction with associated mild hydronephrosis (series 2 image 56 )

## 2018-12-23 ENCOUNTER — APPOINTMENT (EMERGENCY)
Dept: RADIOLOGY | Facility: HOSPITAL | Age: 68
End: 2018-12-23
Payer: COMMERCIAL

## 2018-12-23 ENCOUNTER — HOSPITAL ENCOUNTER (EMERGENCY)
Facility: HOSPITAL | Age: 68
Discharge: HOME | End: 2018-12-23
Attending: EMERGENCY MEDICINE | Admitting: EMERGENCY MEDICINE
Payer: COMMERCIAL

## 2018-12-23 VITALS
RESPIRATION RATE: 18 BRPM | SYSTOLIC BLOOD PRESSURE: 144 MMHG | DIASTOLIC BLOOD PRESSURE: 88 MMHG | OXYGEN SATURATION: 95 % | HEART RATE: 70 BPM

## 2018-12-23 DIAGNOSIS — R31.9 HEMATURIA: ICD-10-CM

## 2018-12-23 DIAGNOSIS — J20.9 ACUTE BRONCHITIS: Primary | ICD-10-CM

## 2018-12-23 PROCEDURE — 94640 AIRWAY INHALATION TREATMENT: CPT

## 2018-12-23 PROCEDURE — 71046 X-RAY EXAM CHEST 2 VIEWS: CPT

## 2018-12-23 PROCEDURE — 99283 EMERGENCY DEPT VISIT LOW MDM: CPT

## 2018-12-23 RX ORDER — ALBUTEROL SULFATE 90 UG/1
2 AEROSOL, METERED RESPIRATORY (INHALATION) EVERY 4 HOURS PRN
Qty: 1 INHALER | Refills: 0 | Status: SHIPPED | OUTPATIENT
Start: 2018-12-23

## 2018-12-23 RX ORDER — DOXYCYCLINE HYCLATE 100 MG/1
100 CAPSULE ORAL 2 TIMES DAILY
Qty: 20 CAPSULE | Refills: 0 | Status: SHIPPED | OUTPATIENT
Start: 2018-12-23 | End: 2019-01-02

## 2018-12-23 RX ORDER — ALBUTEROL SULFATE 2.5 MG/3ML
5 SOLUTION RESPIRATORY (INHALATION) ONCE
Status: COMPLETED | OUTPATIENT
Start: 2018-12-23 | End: 2018-12-23

## 2018-12-23 RX ORDER — GUAIFENESIN AND CODEINE PHOSPHATE 100; 10 MG/5ML; MG/5ML
5 SOLUTION ORAL 3 TIMES DAILY PRN
Qty: 90 ML | Refills: 0 | Status: SHIPPED | OUTPATIENT
Start: 2018-12-23 | End: 2018-12-30

## 2018-12-23 RX ADMIN — ALBUTEROL SULFATE 5 MG: 2.5 SOLUTION RESPIRATORY (INHALATION) at 11:26

## 2018-12-23 RX ADMIN — IPRATROPIUM BROMIDE 0.5 MG: 0.5 SOLUTION RESPIRATORY (INHALATION) at 11:26

## 2018-12-23 NOTE — ED PROVIDER NOTES
History  Chief Complaint   Patient presents with    Nasal Congestion     patient presents with nasal/chest congestion since yesterday  patient seen here the other day for kidney stone, panst and bottom of shirt blood stained  patient was unaware of bleeding from penis      77-year-old male with past medical history significant for hypertension, hyperlipidemia and a known 1 4 cm right kidney stone presents to the emergency department with chief complaint of sinus congestion and cough  Onset of symptoms reported as 3 days ago  Location of symptoms reported as the chest and upper respiratory tract  Quality is reported as cough with congestion  Severity reported as mild to moderate  Associated symptoms:  Positive for cough  Positive for runny nose  Positive for sinus congestion  Denies fevers  Denies headaches  Denies shortness of breath  Denies chest pain  Denies lower extremity swelling  Denies rash  Modifying factors:  Patient reports he started doxycycline yesterday without improvement in symptoms  Context:  Patient reports he developed URI symptoms approximately 3 days ago that her getting progressively worse  Positive for recent sick contacts  Denies recent travel outside the country  Patient is a nonsmoker  He does report a history of getting bronchitis" every year or every other year  He also reports he was recently seen and evaluated by Urology for a known 1 4 cm right UPJ stone  He reports no significant flank or abdominal pain  He is able to pass his urine  He denies any urinary retention  He does state that he has had hematuria blood in the urine  He reports occasional clots but currently no obstructive symptoms  He was noted on arrival to ED to have some bleeding from his penis  This is not new and he is currently being treated by urology  He has pending cystoscopy for management of stone and bleeding    Symptoms today are incidental as patient reports he is here primarily for URI symptoms  Reviewed past visits via  [facility], patient last seen as OP by urology on 12/20/18 for evaluation of hematuria/renal calculus  History provided by:  Patient   used: No        Prior to Admission Medications   Prescriptions Last Dose Informant Patient Reported? Taking? ALPRAZolam (XANAX) 2 MG tablet   Yes No   Sig: Take by mouth daily at bedtime as needed     Multiple Vitamins-Minerals (MULTIVITAMIN ADULT) TABS   Yes No   Sig: every 24 hours   acyclovir (ZOVIRAX) 400 MG tablet   No No   Sig: Take 1 tablet (400 mg total) by mouth 2 (two) times a day for 10 days   aspirin 81 mg chewable tablet   Yes No   Sig: every 24 hours   atorvastatin (LIPITOR) 20 mg tablet   Yes No   Sig: TAKE 1 TABLET EVERY DAY   clotrimazole-betamethasone (LOTRISONE) 1-0 05 % cream   No No   Sig: Apply topically 2 (two) times a day   Patient not taking: Reported on 12/20/2018    doxycycline hyclate (VIBRAMYCIN) 100 mg capsule   No No   Sig: Take 1 capsule (100 mg total) by mouth every 24 hours for 90 days   Patient taking differently: Take 100 mg by mouth continuous as needed     furosemide (LASIX) 40 mg tablet   No No   Sig: Take 1 tablet (40 mg total) by mouth daily   metoprolol tartrate (LOPRESSOR) 50 mg tablet   Yes No   Sig: Take 50 mg by mouth 2 (two) times a day   mometasone (NASONEX) 50 mcg/act nasal spray   Yes No   Sig: spray 2 spray by intranasal route  every day in each nostril   ondansetron (ZOFRAN-ODT) 4 mg disintegrating tablet   No No   Sig: Place 1 tab (4 mg) under your tongue every 6 hours for nausea or vomiting     Patient not taking: Reported on 12/20/2018    oxyCODONE-acetaminophen (PERCOCET) 5-325 mg per tablet   No No   Sig: Take 1 tablet by mouth every 6 (six) hours as needed for severe pain for up to 10 days Max Daily Amount: 4 tablets   sildenafil (VIAGRA) 100 mg tablet   Yes No   Sig: take 1 tablet (100MG)  by oral route  every day as needed approximately 1 hour before sexual activity   warfarin (COUMADIN) 2 5 mg tablet   Yes No   Sig: take 1-2 tabs hs      Facility-Administered Medications: None       Past Medical History:   Diagnosis Date    Anxiety     Hyperlipidemia     Hypertension        Past Surgical History:   Procedure Laterality Date    COLONOSCOPY  2012    OTHER SURGICAL HISTORY  2017    Tricupsid valve repair    OTHER SURGICAL HISTORY  2017    Aortic aneurysm repair        Family History   Problem Relation Age of Onset    Hypertension Mother     Cancer Mother     Cancer Father      I have reviewed and agree with the history as documented  Social History   Substance Use Topics    Smoking status: Never Smoker    Smokeless tobacco: Never Used      Comment: No passive smoke exposure    Alcohol use Yes      Comment: social        Review of Systems   Constitutional: Negative for activity change, appetite change, chills, diaphoresis, fatigue, fever and unexpected weight change  HENT: Positive for congestion, postnasal drip, rhinorrhea and sinus pressure  Negative for dental problem, drooling, ear discharge, ear pain, facial swelling, hearing loss, mouth sores, nosebleeds, sinus pain, sneezing, sore throat, tinnitus, trouble swallowing and voice change  Eyes: Negative for photophobia, pain, discharge, redness, itching and visual disturbance  Respiratory: Positive for cough and wheezing  Negative for apnea, choking, chest tightness, shortness of breath and stridor  Cardiovascular: Negative for chest pain, palpitations and leg swelling  Gastrointestinal: Negative for abdominal distention, abdominal pain, anal bleeding, blood in stool, constipation, diarrhea, nausea, rectal pain and vomiting  Endocrine: Negative for cold intolerance, heat intolerance, polydipsia, polyphagia and polyuria  Genitourinary: Positive for hematuria   Negative for decreased urine volume, difficulty urinating, discharge, dysuria, flank pain, penile pain, penile swelling, scrotal swelling, testicular pain and urgency  Musculoskeletal: Negative for arthralgias, back pain, gait problem, joint swelling, myalgias, neck pain and neck stiffness  Skin: Negative for color change, pallor, rash and wound  Allergic/Immunologic: Negative for environmental allergies, food allergies and immunocompromised state  Neurological: Negative for dizziness, tremors, seizures, syncope, facial asymmetry, speech difficulty, weakness, light-headedness, numbness and headaches  Hematological: Negative for adenopathy  Does not bruise/bleed easily  Psychiatric/Behavioral: Negative for agitation, behavioral problems and confusion  The patient is not nervous/anxious  All other systems reviewed and are negative  Physical Exam  Physical Exam   Constitutional: He is oriented to person, place, and time  He appears well-developed and well-nourished  No distress  /88   Pulse 70   Resp 18   SpO2 95%   interp normal, no intervention     HENT:   Head: Normocephalic and atraumatic  Right Ear: External ear normal    Left Ear: External ear normal    Mouth/Throat: No oropharyngeal exudate  There is yellow mucous drainage noted to the posterior pharynx  Uvula is midline without deviation  Tonsils without edema or purulent exudate  Nasal turbinates aren't injected and edematous bilaterally with yellow mucous nasal drainage noted   Eyes: Pupils are equal, round, and reactive to light  Conjunctivae and EOM are normal  Right eye exhibits no discharge  Left eye exhibits no discharge  No scleral icterus  Neck: Normal range of motion  Neck supple  No JVD present  No tracheal deviation present  No thyromegaly present  Cardiovascular: Normal rate and regular rhythm  Pulmonary/Chest: Effort normal  No stridor  No respiratory distress  He has wheezes  He has no rales  He exhibits no tenderness  Breath sounds present bilaterally on auscultation  Scattered rhonchi bilaterally   No retractions or accessory muscle use  Abdominal: Soft  Bowel sounds are normal  He exhibits no distension and no mass  There is no tenderness  There is no rebound and no guarding  No hernia  Musculoskeletal: Normal range of motion  He exhibits no edema, tenderness or deformity  Lymphadenopathy:     He has no cervical adenopathy  Neurological: He is alert and oriented to person, place, and time  He has normal reflexes  He displays normal reflexes  No cranial nerve deficit or sensory deficit  He exhibits normal muscle tone  Coordination normal    Skin: Skin is warm and dry  Capillary refill takes less than 2 seconds  No rash noted  He is not diaphoretic  No erythema  No pallor  Psychiatric: He has a normal mood and affect  His behavior is normal  Judgment and thought content normal    Nursing note and vitals reviewed  Vital Signs  ED Triage Vitals [12/23/18 1015]   Temp Pulse Respirations Blood Pressure SpO2   -- 70 18 144/88 95 %      Temp src Heart Rate Source Patient Position - Orthostatic VS BP Location FiO2 (%)   -- -- -- -- --      Pain Score       --           Vitals:    12/23/18 1015   BP: 144/88   Pulse: 70       Visual Acuity      ED Medications  Medications   albuterol inhalation solution 5 mg (5 mg Nebulization Given 12/23/18 1126)   ipratropium (ATROVENT) 0 02 % inhalation solution 0 5 mg (0 5 mg Nebulization Given 12/23/18 1126)       Diagnostic Studies  Results Reviewed     None                 XR chest 2 views    (Results Pending)              Procedures  Procedures       Phone Contacts  ED Phone Contact    ED Course                               MDM  Number of Diagnoses or Management Options  Acute bronchitis: new and requires workup  Hematuria: new and requires workup  Diagnosis management comments: ddx includes but is not limited to:  URI, RSV, sinusitis, OE, OM, serous otitis media,  flu, allergies, viral syndrome, asthma, bronchitis, pneumonia, consider but doubt interstitial lung disease, pertussis  Discussed with patient as he has no fever, no urinary obstructive symptoms and no flank/abd/back pain at this time and hematuria is previously existing no indication for workup of hematuria  I did extensively discuss with patient reasons to return to ED regarding hematuria and he is already established with urology who is currently evaluating symptoms  Will check cxr and add nebulizer treatment  chest xray images independently visualized and interpreted by me  No acute pneumothorax or infiltrate  Results discussed with patient at bedside  Discussed diagnosis of bronchitis  Will treat with antibiotics, add bronchodilators and cough medication  Follow up with pcp for recheck in 2-3 days  Follow up with urology regarding further treatment of hematuria  Reviewed reasons to return to ed  Patient verbalized understanding of diagnosis and agreement with discharge plan of care as well as understanding of reasons to return to ed  Amount and/or Complexity of Data Reviewed  Tests in the radiology section of CPT®: ordered and reviewed  Discussion of test results with the performing providers: yes  Review and summarize past medical records: yes  Independent visualization of images, tracings, or specimens: yes    Patient Progress  Patient progress: stable    CritCare Time    Disposition  Final diagnoses:   Acute bronchitis   Hematuria     Time reflects when diagnosis was documented in both MDM as applicable and the Disposition within this note     Time User Action Codes Description Comment    12/23/2018  1:26 PM Eddie Cornell Add [J20 9] Acute bronchitis     12/23/2018  1:27 PM Eddie Cornell Add [R31 9] Hematuria       ED Disposition     ED Disposition Condition Comment    Discharge  Margretkarinmalachi Burksroderick discharge to home/self care      Condition at discharge: Good        Follow-up Information     Follow up With Specialties Details Why Contact Info Additional Information    Cata Correa MD Family Medicine Call in 2 days for further evaluation of symptoms [facility]  [address] Emergency Department Emergency Medicine Go to If symptoms worsen [address] [facility] [place] [address]  [phone] [place] ED, [address], [place], Dayanara Miller MD Urology Call in 2 days for further evaluation of symptoms [address]  [facility] [place]  [phone]             Discharge Medication List as of 12/23/2018  1:29 PM      START taking these medications    Details   albuterol (PROVENTIL HFA,VENTOLIN HFA) 90 mcg/act inhaler Inhale 2 puffs every 4 (four) hours as needed for wheezing, Starting Sun 12/23/2018, Print      !! doxycycline hyclate (VIBRAMYCIN) 100 mg capsule Take 1 capsule (100 mg total) by mouth 2 (two) times a day for 10 days, Starting Sun 12/23/2018, Until Wed 1/2/2019, Print      guaifenesin-codeine (GUAIFENESIN AC) 100-10 MG/5ML liquid Take 5 mL by mouth 3 (three) times a day as needed for cough for up to 7 days, Starting Sun 12/23/2018, Until Sun 12/30/2018, Print       !! - Potential duplicate medications found  Please discuss with provider        CONTINUE these medications which have NOT CHANGED    Details   acyclovir (ZOVIRAX) 400 MG tablet Take 1 tablet (400 mg total) by mouth 2 (two) times a day for 10 days, Starting Tue 10/16/2018, Until Fri 10/26/2018, Normal      ALPRAZolam (XANAX) 2 MG tablet Take by mouth daily at bedtime as needed  , Starting Mon 12/2/2013, Historical Med      aspirin 81 mg chewable tablet every 24 hours, Starting Mon 7/3/2017, Historical Med      atorvastatin (LIPITOR) 20 mg tablet TAKE 1 TABLET EVERY DAY, Historical Med      clotrimazole-betamethasone (LOTRISONE) 1-0 05 % cream Apply topically 2 (two) times a day, Starting Tue 9/25/2018, Normal      !! doxycycline hyclate (VIBRAMYCIN) 100 mg capsule Take 1 capsule (100 mg total) by mouth every 24 hours for 90 days, Starting Tue 10/16/2018, Until Mon 1/14/2019, Normal      furosemide (LASIX) 40 mg tablet Take 1 tablet (40 mg total) by mouth daily, Starting Wed 10/24/2018, Normal      metoprolol tartrate (LOPRESSOR) 50 mg tablet Take 50 mg by mouth 2 (two) times a day, Starting Tue 6/12/2018, Historical Med      mometasone (NASONEX) 50 mcg/act nasal spray spray 2 spray by intranasal route  every day in each nostril, Historical Med      Multiple Vitamins-Minerals (MULTIVITAMIN ADULT) TABS every 24 hours, Starting Wed 11/4/2015, Historical Med      ondansetron (ZOFRAN-ODT) 4 mg disintegrating tablet Place 1 tab (4 mg) under your tongue every 6 hours for nausea or vomiting , Print      oxyCODONE-acetaminophen (PERCOCET) 5-325 mg per tablet Take 1 tablet by mouth every 6 (six) hours as needed for severe pain for up to 10 days Max Daily Amount: 4 tablets, Starting Wed 12/19/2018, Until Sat 12/29/2018, Print      sildenafil (VIAGRA) 100 mg tablet take 1 tablet (100MG)  by oral route  every day as needed approximately 1 hour before sexual activity, Historical Med      warfarin (COUMADIN) 2 5 mg tablet take 1-2 tabs hs, Historical Med       !! - Potential duplicate medications found  Please discuss with provider  No discharge procedures on file      ED Provider  Electronically Signed by           Ricardo Olsen PA-C  12/23/18 5346

## 2018-12-23 NOTE — DISCHARGE INSTRUCTIONS
Acute Bronchitis   WHAT YOU SHOULD KNOW:   Acute bronchitis is swelling and irritation in the air passages of your lungs  This irritation may cause you to cough or have other breathing problems  Acute bronchitis often starts because of another viral illness, such as a cold or the flu  The illness spreads from your nose and throat to your windpipe and airways  Bronchitis is often called a chest cold  Acute bronchitis lasts about 2 weeks and is usually not a serious illness  AFTER YOU LEAVE:   Medicines:   · Ibuprofen or acetaminophen:  These medicines help lower a fever  They are available without a doctor's order  Ask your healthcare provider which medicine is right for you  Ask how much to take and how often to take it  Follow directions  These medicines can cause stomach bleeding if not taken correctly  Ibuprofen can cause kidney damage  Do not take ibuprofen if you have kidney disease, an ulcer, or allergies to aspirin  Acetaminophen can cause liver damage  Do not drink alcohol if you take acetaminophen  · Cough medicine: This medicine helps loosen mucus in your lungs and make it easier to cough up  This can help you breathe easier  · Inhalers: You may need one or more inhalers to help you breathe easier and cough less  An inhaler gives your medicine in a mist form so that you can breathe it into your lungs  Ask your healthcare provider to show you how to use your inhaler correctly  · Steroid medicine:  Steroid medicine helps open your air passages so you can breathe easier  · Take your medicine as directed  Call your healthcare provider if you think your medicine is not helping or if you have side effects  Tell him if you are allergic to any medicine  Keep a list of the medicines, vitamins, and herbs you take  Include the amounts, and when and why you take them  Bring the list or the pill bottles to follow-up visits  Carry your medicine list with you in case of an emergency    How to use an inhaler:   · Shake the inhaler well to make sure you get the correct amount of medicine per puff  Remove the cover from your inhaler's mouthpiece  If you are using a spacer, connect your inhaler to the flat end of the spacer  · Exhale as much air from your lungs as you can  Put the mouthpiece in your mouth past your front teeth and rest it on the top of your tongue  Do not block the mouthpiece opening with your tongue  · Breathe in through your mouth at a slow and steady rate  As you do this, press the inhaler to release the puff of medicine  Finish breathing in slowly and deeply as you inhale the medicine  When your lungs are full, hold your breath for 10 seconds  Then breathe out slowly through puckered lips or through your nose  · If you need to take more puffs, wait at least 1 minute between each puff  · Rinse your mouth with water after you use the inhaler  This may keep you from getting a mouth infection or irritation  · Follow the instructions that come with your inhaler to clean it  You should clean your inhaler at least once a week  Ways to care for yourself:   · Avoid alcohol:  Alcohol dulls your urge to cough and sneeze  When you have bronchitis, you need to be able to cough and sneeze to clear your air passages  Alcohol also causes your body to lose fluid  This can make the mucus in your lungs thicker and harder to cough up  · Avoid irritants in the air:  Do not smoke or allow others to smoke around you  Avoid chemicals, fumes, and dust  Wear a face mask if you must work around dust or fumes  Stay inside on days when air pollution levels are high  If you have allergies, stay inside when pollen counts are high  Avoid aerosol products  This includes spray-on deodorant, bug spray, and hair spray  · Drink more liquids:  Most people should drink at least 8 eight-ounce cups of water a day  You may need to drink more liquids when you have acute bronchitis   Liquids help keep your air passages moist and help you cough up mucus  · Get more rest:  You may feel like resting more  Slowly start to do more each day  Rest when you feel it is needed  · Eat healthy foods:  Eat a variety healthy foods every day  Your diet should include fruits, vegetables, breads, and protein (such as chicken, fish, and beans)  Dairy products (such as milk, cheese, and ice cream) can sometimes increase the amount of mucus your body makes  Ask if you should decrease your intake of dairy products  · Use a humidifier:  Use a cool mist humidifier to increase air moisture in your home  This may make it easier for you to breathe and help decrease your cough  Decrease your risk of acute bronchitis:   · Get the vaccinations you need:  Ask your healthcare provider if you should get vaccinated against the flu or pneumonia  · Avoid things that may irritate your lungs:  Stay inside or cover your mouth and nose with a scarf when you are outside during cold weather  You should also stay inside on days when air pollution levels are high  If you have allergies, stay inside when pollen counts are high  Avoid using aerosol products in your home  This includes spray-on deodorant, bug spray, and hair spray  · Avoid the spread of germs:        [facility] your hands often with soap and water  Carry germ-killing gel with you  You can use the gel to clean your hands when there is no soap and water available  ¨ Do not touch your eyes, nose, or mouth unless you have washed your hands first     ¨ Always cover your mouth when you cough  Cough into a tissue or your shirtsleeve so you do not spread germs from your hands  ¨ Try to avoid people who have a cold or the flu  If you are sick, stay away from others as much as possible  Follow up with your healthcare provider as directed:  Write down questions you have so you will remember to ask them during your follow-up visits    Contact your healthcare provider if:   · You have a fever     · Your skin becomes itchy or you have a rash after you take your medicine  · Your breathing problems do not go away or get worse  · Your cough does not get better with treatment  · You cough up blood  · You have questions or concerns about your condition or care  Seek care immediately or call 911 if:   · You faint  · Your lips or fingernails turn blue  · You feel like you are not getting enough air when you breathe  · You have swelling of your lips, tongue, or throat that makes it hard to breathe or swallow  © 2014 [address] is for End User's use only and may not be sold, redistributed or otherwise used for commercial purposes  All illustrations and images included in CareNotes® are the copyrighted property of A D A M [employer]  or Lamont Reyna  The above information is an  only  It is not intended as medical advice for individual conditions or treatments  Talk to your doctor, nurse or pharmacist before following any medical regimen to see if it is safe and effective for you  Acute Hematuria   WHAT YOU SHOULD KNOW:   Hematuria is blood in your urine from an injury or medical condition  Acute means the problem starts suddenly, worsens quickly, and lasts a short time  Your urine may be bright red to dark brown  Some common causes of hematuria are bladder infection, kidney stone, trauma to the kidneys or bladder, and some medications  Sometimes blood clots can block the urethra so that you cannot empty your bladder  AFTER YOU LEAVE:   Medicines:  Ask about these or other medicines you may need to treat the cause of your acute hematuria:  · Antibiotics: This medicine is given to fight or prevent an infection caused by bacteria  Always take your antibiotics exactly as ordered by your healthcare provider  Do not stop taking your medicine unless directed by your healthcare provider   Never save antibiotics or take leftover antibiotics that were given to you for another illness  · Take your medicine as directed  Call your healthcare provider if you think your medicine is not helping or if you have side effects  Tell him if you are allergic to any medicine  Keep a list of the medicines, vitamins, and herbs you take  Include the amounts, and when and why you take them  Bring the list or the pill bottles to follow-up visits  Carry your medicine list with you in case of an emergency  Increase the amount of fluid you drink:  Drink clear fluids to help flush the blood from your urinary tract  Follow instructions about how much fluid to drink  Follow up with your healthcare provider as directed: Your healthcare provider will tell you how often to come in for follow-up visits  He may refer you to a specialist, such as a urologist or nephrologist  The specialists may do tests or procedures to find more serious problems with your urinary system  Write down your questions so you remember to ask them during your visits  Contact your healthcare provider if:   · You have a fever that gets worse or does not go away with treatment  · You cannot keep liquids or medicines down  · Your urine gets darker, even after you drink extra liquids  · You have questions or concerns about your condition, treatment, or care  Seek care immediately or call 911 if:   · You have blood in your urine after a new injury, such as a fall  · You are urinating very small amounts or not at all  · You feel like you cannot empty your bladder  · You have severe back or side pain that does not go away with treatment  © 2014 [address] is for End User's use only and may not be sold, redistributed or otherwise used for commercial purposes  All illustrations and images included in CareNotes® are the copyrighted property of [employer]  or Lmaont Reyna  The above information is an  only   It is not intended as medical advice for individual conditions or treatments  Talk to your doctor, nurse or pharmacist before following any medical regimen to see if it is safe and effective for you

## 2018-12-28 PROBLEM — N20.1 RIGHT URETERAL STONE: Status: ACTIVE | Noted: 2018-12-28

## 2019-01-03 ENCOUNTER — TELEPHONE (OUTPATIENT)
Dept: UROLOGY | Facility: AMBULATORY SURGERY CENTER | Age: 69
End: 2019-01-03

## 2019-01-03 NOTE — TELEPHONE ENCOUNTER
Patient is scheduled for surgery with Dr Nikko Muniz on 1/14/19 Ivett Crum  Patient is scheduled for Cysto/Rt ureteroscopy/Laser Stone extraction and Stent Insertion  - patient has decided to have his surgery at [facility] with his provider there   - Surgery with Dr Nikko Muniz will be cancelled -jj

## 2024-05-21 ENCOUNTER — TELEPHONE (OUTPATIENT)
Age: 74
End: 2024-05-21

## 2024-08-21 ENCOUNTER — HOSPITAL ENCOUNTER (OUTPATIENT)
Dept: RADIOLOGY | Facility: HOSPITAL | Age: 74
Discharge: HOME | End: 2024-08-21
Payer: COMMERCIAL

## 2024-08-21 VITALS — HEIGHT: 60 IN | WEIGHT: 123 LBS | OXYGEN SATURATION: 99 % | HEART RATE: 70 BPM | BODY MASS INDEX: 24.15 KG/M2

## 2024-08-21 DIAGNOSIS — M25.561 RIGHT KNEE PAIN, UNSPECIFIED CHRONICITY: ICD-10-CM

## 2024-08-21 DIAGNOSIS — S86.911A STRAIN OF RIGHT KNEE, INITIAL ENCOUNTER: ICD-10-CM

## 2024-08-21 DIAGNOSIS — M25.561 RIGHT KNEE PAIN, UNSPECIFIED CHRONICITY: Primary | ICD-10-CM

## 2024-08-21 PROCEDURE — 99203 OFFICE O/P NEW LOW 30 MIN: CPT | Performed by: PHYSICIAN ASSISTANT

## 2024-08-21 PROCEDURE — 73562 X-RAY EXAM OF KNEE 3: CPT

## 2024-08-21 NOTE — PROGRESS NOTES
Assessment & Plan   Diagnoses and all orders for this visit:    Right knee pain, acute    Right knee strain    - Start PT - he prefers home exercise program over in-person PT. This is detailed in the AVS  - Ice, Tylenol as needed  - Discussed cortisone injection.  Declined at this time by pt because his symptoms are mild  - Follow up PRN with Dr. Stover          Subjective   Patient ID: Juan Bush is a 73 y.o. male.    Vitals:    08/21/24 1329   Pulse: 70   SpO2: 99%     74yo male comes in for an evaluation of his right knee.  He started having pain about 3 weeks ago.  His heavy metal tractor was stuck and he had to push it out.  The next morning, he noticed some soreness in the knee.  No fevers or chills.  No swelling.  No clicking, catching, or popping.  The pain is dull in character, mild in severity, does not radiate and is not associated with numbness.          The following portions of the patient's history were reviewed and updated as appropriate: allergies, current medications, past family history, past medical history, past social history, past surgical history, and problem list.    Review of Systems  Ortho Exam  Past Medical History:   Diagnosis Date    Anxiety     Hyperlipidemia     Hypertension      Past Surgical History:   Procedure Laterality Date    COLONOSCOPY  2012    OTHER SURGICAL HISTORY  2017    Tricupsid valve repair    OTHER SURGICAL HISTORY  2017    Aortic aneurysm repair      Family History   Problem Relation Age of Onset    Hypertension Mother     Cancer Mother     Cancer Father      Social History     Occupational History    Not on file   Tobacco Use    Smoking status: Never    Smokeless tobacco: Never    Tobacco comments:     No passive smoke exposure   Substance and Sexual Activity    Alcohol use: Yes     Comment: social    Drug use: Not on file    Sexual activity: Not on file       Review of Systems   Constitutional: Negative.    HENT: Negative.    Eyes: Negative.    Respiratory:  Negative.    Cardiovascular: Negative.    Gastrointestinal: Negative.    Endocrine: Negative.    Genitourinary: Negative.    Musculoskeletal: As below..   Allergic/Immunologic: Negative.    Neurological: Negative.    Hematological: Negative.    Psychiatric/Behavioral: Negative.        Objective   Physical Exam    Constitutional: Awake, Alert, Oriented  Eyes: EOMI  Psych: Mood and affect appropriate  Heart: regular rate   Lungs: No audible wheezing  Abdomen: No guarding  Lymph: no lymphedema  right Knee:  Appearance  No swelling, discoloration, deformity, or ecchymosis  Effusion  none  Palpation  No tenderness about the medial / lateral joint line, patella, patellar tendon, MCL, LCL, hamstrings, or medial / lateral tibial plateau.  ROM  Extension: 0 and Flexion: 130  Special Tests  Minimal medial pain with lateral Chriss test  Lachman's Test negative, Anterior Drawer Test negative, Posterior Drawer Test negative, Valgus Stress Test negative, Varus Stress Test negative, and Patellar apprehension negative  Motor  normal 5/5 in all planes  NVI distally    Xrays:  I have personally reviewed pertinent films in PACS and my interpretation is no acute displaced fracture or osteoarthritis.

## 2024-08-21 NOTE — PATIENT INSTRUCTIONS
Patient Education     Lower Extremity Exercises Seated   About this topic   Some people are not able to stand or have problems with balance. You can still safely exercise while seated in a chair. If you have a desk job, doing exercises while seated at your desk can also be helpful. There are many different exercises that you can do to help keep your legs strong and moving well.  General   Before starting with a program, ask your doctor if you are healthy enough to do these exercises. Your doctor may have you work with a  or physical therapist to make a safe exercise program to meet your needs.  Stretching Exercises   Stretching exercises keep your muscles flexible. They also stop them from getting tight. Start by doing each of these stretches 2 to 3 times. In order for your body to make changes, you will need to hold these stretches for 20 to 30 seconds. Repeat each exercise 2 to 3 times each day. Do all exercises slowly.  Hamstring stretches seated ? Sit up straight on the edge of a chair. Make sure you keep your back straight. Straighten your knee on your left leg. Keep your heel on the floor. Bend forward at the waist towards your foot while keeping your upper back straight. Bend forward until you feel a stretch in the back of your thigh. Repeat on the other leg.  Calf stretches seated with belt or towel ? Sit on a chair or on the floor with your legs straight out in front of you. Loop a belt or towel around the ball of one foot. Pull on the towel until you feel a stretch in the back of your calf. Repeat on the other foot. You can also do this same stretch while lying down on your back with the knee straight and foot up in the air.  Strengthening Exercises   Strengthening exercises keep your muscles firm and strong. Be sure to use good posture. Start by repeating each exercise 2 to 3 times. Work up to doing each exercise 10 times. Try to do the exercises 2 to 3 times each day. Do all exercises  slowly.  Marching ? March while sitting down. Sit up straight and do not lean back. Pick one leg up at a time. Do this exercise in slow motion to make it more difficult.  Thigh squeezes ? Place a small pillow or ball in between your knees. Squeeze your thighs together and hold 3 to 5 seconds.  Ankle alphabet seated - Straighten your knee on your left leg. Act like you are writing the alphabet with each foot. Do all of the alphabet. Repeat on the other leg. Take short rests if you get tired. If it is too hard to do the entire alphabet, try writing different words when watching commercials on TV. Making really big letters will help strengthen the hip, knee, and ankle.  Ankle pumps seated ? Move each foot up and down like you are pressing down and lifting up on a gas pedal.               What will the results be?   Stronger muscles  Better flexibility and range of motion  Less muscle tightness  Less muscle cramps  Better circulation  Easier to walk and do other activities  Helpful tips   Stay active and work out to keep your muscles strong and flexible.  Keep a healthy weight so there is not extra stress on your joints. Eat a healthy diet to keep your muscles healthy.  Be sure you do not hold your breath when exercising. This can raise your blood pressure. If you tend to hold your breath, try counting out loud when exercising. If any exercise bothers you, stop right away.  Always warm up before stretching. Heated muscles stretch much easier than cool muscles. Stretching cool muscles can lead to injury.  Try walking or cycling at an easy pace for a few minutes to warm up your muscles. Do this again after exercising.  Never bounce when doing stretches.  If you like to watch TV, try doing one or two of these exercises at each commercial.  Doing exercises before a meal may be a good way to get into a routine.  Exercise may be slightly uncomfortable, but you should not have sharp pains. If you do get sharp pains, stop what  you are doing. If the sharp pains continue, call your doctor.  Last Reviewed Date   2021-07-26  Consumer Information Use and Disclaimer   This generalized information is a limited summary of diagnosis, treatment, and/or medication information. It is not meant to be comprehensive and should be used as a tool to help the user understand and/or assess potential diagnostic and treatment options. It does NOT include all information about conditions, treatments, medications, side effects, or risks that may apply to a specific patient. It is not intended to be medical advice or a substitute for the medical advice, diagnosis, or treatment of a health care provider based on the health care provider's examination and assessment of a patient’s specific and unique circumstances. Patients must speak with a health care provider for complete information about their health, medical questions, and treatment options, including any risks or benefits regarding use of medications. This information does not endorse any treatments or medications as safe, effective, or approved for treating a specific patient. [employer] and its affiliates disclaim any warranty or liability relating to this information or the use thereof. The use of this information is governed by the Terms of Use, available at https://www.[employer].com/en/know/clinical-effectiveness-terms   Copyright   Copyright © 2024 [employer] and its affiliates and/or licensors. All rights reserved.